# Patient Record
(demographics unavailable — no encounter records)

---

## 2021-01-01 NOTE — PCM.NBADM
[place] History





-  Admission Detail


Date of Service: 21


[place] Admission Detail: 


Mom is 27 yrs old  presented for induction of labor due to post dates.





Mm is , A neg, Rubella immune, group B strep neg, PRP neg,HIV neg, Hep Band

C negative, RPR neg GC/Cl neg 





mom has a history of surgical correction of septate uterus





Mom had Tmax of 100.4 during labor and SROM @8.00pm 2021  8 hours








Anesthesia : epidural concerted to spinal





Baby became breech during labor : requested to attend delivery of urgent C 

section due to breech presentation 





Apgars 0 and 9





Resusitation : Baby was limp and romero at delivery initiated PPV with T  piece 

20/5 ,O2 10 L and 100 % no heart rate was audible so  chest compressions were 

started. Heart rate was established after 2 min of chest compressions and PPV 

continued for another minute after which she established spontaneous res

pirations . She required stimulation and suction, and no further oxygen 

supplementation. Transitional care was provided in the nursery.





Requested cap gas and  maternal cord gases





Sepsis risk factor for well appearing 0.25 , no indications for lab work or 

antibiotic therapy  if she remains stable.











- Maternal History


: 2


Term: 1


Abortions: 1


Live Births: 1


Mother's Blood Type: A


Mother's Rh: Negative


Maternal Hepatitis B: Negative


Maternal STD: Negative


Maternal HIV: Negative


Maternal Group Beta Strep/GBS: Negative


Maternal VDRL: Negative


Maternal Urine Toxicology: Negative


Prenatal Care Received: Yes


MD Office Called for Prenatal Records: Yes


Labs Drawn if Required: Yes


Other Pregnancy Complications: breech presentation, maternal temp during labor





[place] Nursery Information


Sex, Infant: Female


Weight: 3130 kg


Cry Description: Normal Pitch


[place] Reflex: Normal Response


Suck Reflex: Normal Response


O2 Sat by Pulse Oximetry: 97


Heart Rate Apical: 140





[place] Physician Exam





- Exam


Exam: See Below


Activity: Sleeping, Active


Head: Face Symmetrical, Atraumatic, Normocephalic


Eyes: Bilateral: Normal Inspection


Ears: Normal Appearance, Symmetrical


Nose: Normal Inspection, Normal Mucosa


Mouth: Nnormal Inspection, Palate Intact


Neck: Normal Inspection, Supple, Trachea Midline


Chest/Cardiovascular: Normal Appearance, Normal Peripheral Pulses, Regular Heart

Rate, Symmetrical


Respiratory: Lungs Clear, Normal Breath Sounds, No Respiratoy Distress


Abdomen/GI: Normal Bowel Sounds, No Mass, Symmetrical, Soft


Rectal: Normal Exam


Genitalia (Female): Normal External Exam


Spine/Skeletal: Normal Inspection, Normal Range of Motion


Extremities: Normal Inspection, Normal Capillary Refill, Normal Range of Motion


Skin: Dry, Intact, Normal Color, Warm





 Assessment and Plan


(1) Liveborn infant by vaginal delivery


SNOMED Code(s): 838517824, 116261883


   Code(s): Z38.00 - SINGLE LIVEBORN INFANT, DELIVERED VAGINALLY   Status: Acute

  Current Visit: Yes   


Assessment:: 


healthy term female 








(2)  affected by breech delivery


SNOMED Code(s): 2810395, 684129523


   Code(s): P03.0 -  AFFECTED BY BREECH DELIVERY AND EXTRACTION   Status:

Acute   Current Visit: Yes   


Assessment:: 


 depression related to  breech delivery





Problem List Initiated/Reviewed/Updated: Yes


Plan: 





monitor initally in the nursery for respiratory distress


 base line cord gases from mom , 


blood glucose and cap gas on baby

## 2021-01-01 NOTE — PCM.PNNB
- General Info


Date of Service: 21





- Patient Data


Vital Signs: 


                                Last Vital Signs











Temp  97.7 F   21 08:25


 


Pulse  113   21 08:25


 


Resp  44   21 08:25


 


BP  73/38   21 04:35


 


Pulse Ox  97   21 05:01











Weight: 2.99 kg


Labs Last 24 Hours: 


                         Laboratory Results - last 24 hr











  21 Range/Units





  03:34 04:41 


 


Neonat Total Bilirubin   5.3  (0.1-12.0)  mg/dL


 


Neonat Direct Bilirubin   0.2  (0.0-2.0)  mg/dL


 


Neonat Indirect Bili   5.1  (0.0-10.0)  mg/dL


 


YULI, Poly Interpret  NEGATIVE   (NEGATIVE)  











Current Medications: 


                               Current Medications





Dextrose (Glutose 15)  0 gm PO ONETIME PRN; Protocol


   PRN Reason: Hypoglycemia


Erythromycin (Erythromycin 0.5% Ophth Oint)  1 gm EYEBOTH ONETIME PRN


   PRN Reason: For Delivery


   Last Admin: 21 05:00 Dose:  1 gm


   Documented by: 


Phytonadione (Aquamephyton)  1 mg IM ONETIME PRN


   PRN Reason: For Delivery


   Last Admin: 21 08:01 Dose:  1 mg


   Documented by: 





Discontinued Medications





Hepatitis B Vaccine (Engerix-B (Pediatric))  10 mcg IM .ONCE ONE


   Stop: 21 04:47


   Last Admin: 21 08:07 Dose:  10 mcg


   Documented by: 











- General/Neuro


Activity: Active


Resting Posture: Flexion





- Exam


Eyes: Bilateral: Normal Inspection


Ears: Normal Appearance, Symmetrical


Nose: Normal Inspection, Normal Mucosa


Mouth: Nnormal Inspection, Palate Intact


Chest/Cardiovascular: Normal Appearance, Normal Peripheral Pulses, Regular Heart

Rate, Symmetrical


Respiratory: Lungs Clear, Normal Breath Sounds, No Respiratoy Distress


Abdomen/GI: Normal Bowel Sounds, No Mass, Symmetrical, Soft


Extremities: Normal Inspection, Normal Capillary Refill, Normal Range of Motion


Skin: Dry, Intact, Normal Color, Warm





- Subjective


Note: 


mom and baby are doing well, 


baby is voiding and stooling


baby is breast feeding well


 vital signs are stable 





bili was 5.3, LIR @ 25 hours


 Mom is A neg and baby O +, YULI neg





- Problem List & Annotations


(1) Liveborn infant by vaginal delivery


SNOMED Code(s): 762201025, 608946347


   Code(s): Z38.00 - SINGLE LIVEBORN INFANT, DELIVERED VAGINALLY   Status: Acute

  Current Visit: Yes   





(2)  affected by breech delivery


SNOMED Code(s): 7743943, 216197680


   Code(s): P03.0 -  AFFECTED BY BREECH DELIVERY AND EXTRACTION   Status:

Acute   Current Visit: Yes   





- Problem List Review


Problem List Initiated/Reviewed/Updated: Yes





- My Orders


Last 24 Hours: 


My Active Orders





21 04:41


 SCREENING (STATE) [POC] Routine 














- Plan


Plan:: 





monitor initally in the nursery for respiratory distress


 base line cord gases from mom were reassuring , 


blood glucose and cap gas on baby  were normal





Routine well baby care


 support mom with breast feeding

## 2021-01-01 NOTE — PCM.NBDC
Discharge Summary





- Hospital Course


Free Text/Narrative: 





 History





-  Admission Detail


Date of Service: 21


[place] Admission Detail: 


Mom is 27 yrs old  presented for induction of labor due to post dates.





Mm is , A neg, Rubella immune, group B strep neg, HIV neg, Hep Band C 

negative, RPR neg ,GC/Cl neg 





mom has a history of surgical correction of septate uterus





Mom had Tmax of 100.4 during labor and SROM @8.00pm 2021  8 hours








Anesthesia : epidural concerted to spinal





Baby became breech during labor : requested to attend delivery of urgent C 

section due to breech presentation 





time of delivery 0334





birth weight 3.13 kg





Apgars 0 and 9





Resusitation : Baby was limp and romero at delivery initiated PPV with T  piece 

20/5 ,O2 10 L and 100 % no heart rate was audible so  chest compressions were 

started. Heart rate was established after 2 min of chest compressions and PPV 

continued for another minute after which she established spontaneous 

respirations . She required stimulation and suction, and no further oxygen 

supplementation. Transitional care was provided in the nursery.





Requested cap gas and  maternal cord gases





Sepsis risk factor for well appearing 0.25 , no indications for lab work or 

antibiotic therapy  if she remains stable.





Hospital course : discharge weight 2.9 kg down 7.3 %





Vital signs have been stable


Baby is voiding and stooling





FEN : baby has been breast feeding well 





Resp :monitor initally in the nursery for respiratory distress after initial 

resuscitation  base line cord gases from mom were reassuring , 


blood glucose and cap gas on baby  were normal





ID : baby did not have risk factors as per sepsis risk calculator . 





Hem ; bili was 5.3 LIR @ 25 hours  , Mom is A neg and Baby O + Enmanuel +, will 

repeat this am prior to discharge 


 


CCHD : baby passsed CCHD





Hearing baby passed Hearing 





Breech presentation ; baby will need screening Hip US for Congenital hip 

dysplasia at 6 weeks of age 





- Discharge Data


Date of Birth: 21


Delivery Time: 03:44


Discharge Disposition: Home, Self-Care 01


Condition: Good





- Discharge Diagnosis/Problem(s)


(1) Liveborn infant by vaginal delivery


SNOMED Code(s): 683204831, 213582113


   ICD Code: Z38.00 - SINGLE LIVEBORN INFANT, DELIVERED VAGINALLY   Status: 

Acute   Current Visit: Yes   





(2)  affected by breech delivery


SNOMED Code(s): 6823444, 436014434


   ICD Code: P03.0 -  AFFECTED BY BREECH DELIVERY AND EXTRACTION   

Status: Acute   Current Visit: Yes   





- Discharge Plan


Referrals: 


[facility] [Outside]


Kenneth Silva MD [Ordering Only Provider] - 21 8:15 am





[place] Discharge Instructions





- Discharge 


Diet: Breastfeeding


Activity: Don't Co-Sleep w/Infant, Keep Away-Large Crowds, Keep Away-Sick 

People, Place on Back to Sleep


Notify Provider of: Fever Over 100.4 Rectally, Diarrhea Over Twice/Day, Forceful

Vomiting, Refuse 2 or More Feedings, Unusual Rashes, Persistent Crying, 

Persistent Irritability, New Jaundice Skin/Eyes, Worse Jaundice Skin/Eyes, No 

Wet Diaper Over 18 Hrs


Go to Emergency Department or Call 911 If: Difficulty Breathing, Infant is 

Lifeless, Infant is Limp, Skin Turns Blue in Color, Skin Turns Pale


Cord Care: Don't Submerge in Tub, Sponge Bathe Only, Leave Dry


OAE Results Left Ear: Pass


OAE Results Right Ear: Pass





[place] History





- [place] Admission Detail


Date of Service: 01/15/21


Infant Delivery Method: Emergent 





- Maternal History


: 2


Term: 1


Abortions: 1


Live Births: 1


Mother's Blood Type: A


Mother's Rh: Negative


Maternal Hepatitis B: Negative


Maternal STD: Negative


Maternal HIV: Negative


Maternal Group Beta Strep/GBS: Negative


Maternal VDRL: Negative


Maternal Urine Toxicology: Negative


Prenatal Care Received: Yes


MD Office Called for Prenatal Records: Yes


Labs Drawn if Required: Yes


Other Pregnancy Complications: breech presentation, maternal temp during labor





- Delivery Data


APGAR Total Score 5 Minutes: 9


Resuscitation Effort: Bulb Suction, Chest Compression, Dried and Stimulated, 

Other (see below)


Other Resuscitation Effort: PPV


[place] Support Required: [place] Nursery


Infant Delivery Method: Primary 





[place] Nursery Info & Exam





- Exam


Exam: See Below





- Vital Signs


Vital Signs: 


                                Last Vital Signs











Temp  98.7 F   01/15/21 03:40


 


Pulse  124   01/15/21 03:40


 


Resp  45   01/15/21 03:40


 


BP  73/38   21 04:35


 


Pulse Ox  97   21 05:01











[place] Birth Weight: 3.13 kg


Current Weight: 2.9 kg


Height: 48.26 cm





- Nursery Information


Sex, Infant: Female


Cry Description: Normal Pitch


[place] Reflex: Normal Response


Suck Reflex: Normal Response


Head Circumference: 34.29 cm


Abdominal Girth: 33.02 cm


Bed Type: Open Crib





- [place]oi Scoring


Neuro Posture, NB: Flexion All Limbs


Neuro Square Window: Wrist 30 Degrees


Neuro Arm Recoil: Arm Recoil <90 Degrees


Neuro Popliteal Angle: Popliteal Angle 90 Degrees


Neuro Scarf Sign: Elbow Past Same Side


Neuro Heel to Ear: Knee Bent Heel Reaches 45 Degrees from Prone


Neuro Maturity Score: 22


Physical Skin: Cracking, Pale Areas, Rare Veins


Physical Lanugo: Bald Areas


Physical Plantar Surface: Creases Anterior 2/3


Physical Breast: Stippled Areola, 1-2 mm Bud


Physical Eye/Ear: Thick Cartilage, Ear Stiff


Physical Genitals - Female: Majora Large, Minora Small


Physical Maturity Score: 18


Maturity Ratin


Gestational Age in Weeks: 40 Weeks (Maturity Score 40)





- Physical Exam


Head: Face Symmetrical, Atraumatic, Normocephalic


Eyes: Bilateral: Normal Inspection


Ears: Normal Appearance, Symmetrical


Nose: Normal Inspection, Normal Mucosa


Mouth: Nnormal Inspection, Palate Intact


Neck: Normal Inspection, Supple, Trachea Midline


Chest/Cardiovascular: Normal Appearance, Normal Peripheral Pulses, Regular Heart

Rate


Respiratory: Lungs Clear, Normal Breath Sounds, No Respiratoy Distress


Abdomen/GI: Normal Bowel Sounds, No Mass, Symmetrical, Soft


Rectal: Normal Exam


Genitalia (Female): Normal External Exam


Spine/Skeletal: Normal Inspection, Normal Range of Motion


Extremities: Normal Inspection, Normal Capillary Refill, Normal Range of Motion


Skin: Dry, Intact, Normal Color, Warm





[place] POC Testing





- Congenital Heart Disease Screening


CCHD O2 Saturation, Right Hand: 99


CCHD O2 Saturation, Left Foot: 98


CCHD Screen Result: Pass





- Bilirubin Screening


Delivery Date: 21


Delivery Time: 03:44